# Patient Record
Sex: MALE | Race: WHITE | NOT HISPANIC OR LATINO | URBAN - METROPOLITAN AREA
[De-identification: names, ages, dates, MRNs, and addresses within clinical notes are randomized per-mention and may not be internally consistent; named-entity substitution may affect disease eponyms.]

---

## 2023-11-02 ENCOUNTER — OFFICE VISIT (OUTPATIENT)
Dept: PRIMARY CARE | Facility: CLINIC | Age: 18
End: 2023-11-02
Payer: COMMERCIAL

## 2023-11-02 VITALS
OXYGEN SATURATION: 98 % | HEIGHT: 69 IN | BODY MASS INDEX: 28.76 KG/M2 | HEART RATE: 68 BPM | WEIGHT: 194.2 LBS | DIASTOLIC BLOOD PRESSURE: 64 MMHG | SYSTOLIC BLOOD PRESSURE: 114 MMHG

## 2023-11-02 DIAGNOSIS — Z23 NEED FOR VACCINATION: ICD-10-CM

## 2023-11-02 DIAGNOSIS — F41.9 ANXIETY: ICD-10-CM

## 2023-11-02 DIAGNOSIS — Z00.00 PHYSICAL EXAM: Primary | ICD-10-CM

## 2023-11-02 PROCEDURE — 1036F TOBACCO NON-USER: CPT | Performed by: PHYSICIAN ASSISTANT

## 2023-11-02 PROCEDURE — 99395 PREV VISIT EST AGE 18-39: CPT | Performed by: PHYSICIAN ASSISTANT

## 2023-11-02 PROCEDURE — 90471 IMMUNIZATION ADMIN: CPT | Performed by: PHYSICIAN ASSISTANT

## 2023-11-02 ASSESSMENT — PATIENT HEALTH QUESTIONNAIRE - PHQ9
1. LITTLE INTEREST OR PLEASURE IN DOING THINGS: NOT AT ALL
2. FEELING DOWN, DEPRESSED OR HOPELESS: NOT AT ALL
SUM OF ALL RESPONSES TO PHQ9 QUESTIONS 1 AND 2: 0

## 2023-11-02 ASSESSMENT — PAIN SCALES - GENERAL: PAINLEVEL: 0-NO PAIN

## 2023-11-02 NOTE — PROGRESS NOTES
"Subjective   Patient ID: Elías Garcia is a 18 y.o. male who presents for Anxiety.    Presents for PE and complaints of anxiety.   Has had a couple months and recent visit with mental health - advised counseling and [facility].   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 Does report concussion last year.     Anxiety           Review of Systems   All other systems reviewed and are negative.      Objective   /64   Pulse 68   Ht 1.753 m (5' 9\")   Wt 88.1 kg (194 lb 3.2 oz)   SpO2 98%   BMI 28.68 kg/m²     Physical Exam  Constitutional:       General: He is not in acute distress.     Appearance: Normal appearance.   HENT:      Right Ear: Tympanic membrane and ear canal normal.      Left Ear: Tympanic membrane and ear canal normal.      Mouth/Throat:      Pharynx: Oropharynx is clear. No posterior oropharyngeal erythema.   Eyes:      Extraocular Movements: Extraocular movements intact.      Pupils: Pupils are equal, round, and reactive to light.   Cardiovascular:      Rate and Rhythm: Normal rate and regular rhythm.      Heart sounds: Normal heart sounds.   Pulmonary:      Breath sounds: Normal breath sounds.   Abdominal:      General: Bowel sounds are normal.      Palpations: Abdomen is soft.      Tenderness: There is no abdominal tenderness.   Musculoskeletal:         General: Normal range of motion.      Cervical back: Neck supple.   Skin:     Findings: No rash.   Neurological:      Mental Status: He is alert.       Assessment/Plan   Diagnoses and all orders for this visit:  Physical exam  -     Comprehensive Metabolic Panel; Future  -     CBC; Future  -     Lipid Panel; Future  -     Thyroid Stimulating Hormone; Future  Need for vaccination  -     Flu vaccine (IIV4) age 6 months and greater, preservative free         "

## 2023-11-02 NOTE — PATIENT INSTRUCTIONS
Recommend ongoing visit with mental health provider.   Routine labs ordered.   Flu vaccine updated; next year will be due for Tdap.

## 2025-02-18 ENCOUNTER — OFFICE VISIT (OUTPATIENT)
Dept: URGENT CARE | Age: 20
End: 2025-02-18
Payer: COMMERCIAL

## 2025-02-18 VITALS
DIASTOLIC BLOOD PRESSURE: 74 MMHG | TEMPERATURE: 98.2 F | SYSTOLIC BLOOD PRESSURE: 119 MMHG | HEART RATE: 68 BPM | OXYGEN SATURATION: 98 %

## 2025-02-18 DIAGNOSIS — R05.1 ACUTE COUGH: ICD-10-CM

## 2025-02-18 DIAGNOSIS — J01.90 ACUTE SINUSITIS, RECURRENCE NOT SPECIFIED, UNSPECIFIED LOCATION: Primary | ICD-10-CM

## 2025-02-18 RX ORDER — FLUTICASONE PROPIONATE 50 MCG
1 SPRAY, SUSPENSION (ML) NASAL 2 TIMES DAILY
Qty: 16 G | Refills: 0 | Status: SHIPPED | OUTPATIENT
Start: 2025-02-18 | End: 2026-02-18

## 2025-02-18 RX ORDER — AMOXICILLIN AND CLAVULANATE POTASSIUM 875; 125 MG/1; MG/1
1 TABLET, FILM COATED ORAL 2 TIMES DAILY
Qty: 20 TABLET | Refills: 0 | Status: SHIPPED | OUTPATIENT
Start: 2025-02-18 | End: 2025-02-28

## 2025-02-18 ASSESSMENT — ENCOUNTER SYMPTOMS
DIARRHEA: 0
VOMITING: 0
FEVER: 0
COUGH: 1
WHEEZING: 0
HEADACHES: 1
SINUS COMPLAINT: 1
NAUSEA: 0
SORE THROAT: 0
MYALGIAS: 0
SHORTNESS OF BREATH: 0
LOSS OF CONSCIOUSNESS: 0
RHINORRHEA: 0
FATIGUE: 0
ABDOMINAL PAIN: 0

## 2025-02-18 NOTE — LETTER
February 18, 2025     Patient: Elías Garcia   YOB: 2005   Date of Visit: 2/18/2025       To Whom It May Concern:    Elías Garcia was seen in my clinic on 2/18/2025. Please excuse Elías for his absence from school on this day to make the appointment.    If you have any questions or concerns, please don't hesitate to call.         Sincerely,         ANALISA Fleming-CNP        CC: No Recipients

## 2025-02-18 NOTE — PROGRESS NOTES
Subjective   Patient ID: Elías Garcia is a 19 y.o. male. They present today with a chief complaint of Nasal Congestion.    History of Present Illness    History provided by:  Patient   used: No    Sinus Problem  Location:  Maxillary  Quality:  Pressure  Severity:  Mild  Onset quality:  Gradual  Duration:  3 weeks  Timing:  Constant  Progression:  Worsening  Chronicity:  New  Relieved by:  Nothing  Worsened by:  Nothing  Ineffective treatments:  Sudafed  Associated symptoms: congestion, cough and headaches    Associated symptoms: no abdominal pain, no chest pain, no diarrhea, no ear pain, no fatigue, no fever, no loss of consciousness, no myalgias, no nausea, no rash, no rhinorrhea, no shortness of breath, no sore throat, no vomiting and no wheezing        Past Medical History  Allergies as of 02/18/2025    (No Known Allergies)       (Not in a hospital admission)       No past medical history on file.    Past Surgical History:   Procedure Laterality Date    WISDOM TOOTH EXTRACTION  04/2023        reports that he has never smoked. He has never used smokeless tobacco.    Review of Systems  Review of Systems   Constitutional:  Negative for fatigue and fever.   HENT:  Positive for congestion. Negative for ear pain, rhinorrhea and sore throat.    Respiratory:  Positive for cough. Negative for shortness of breath and wheezing.    Cardiovascular:  Negative for chest pain.   Gastrointestinal:  Negative for abdominal pain, diarrhea, nausea and vomiting.   Musculoskeletal:  Negative for myalgias.   Skin:  Negative for rash.   Neurological:  Positive for headaches. Negative for loss of consciousness.            Objective    Vitals:    02/18/25 1458   BP: 119/74   Pulse: 68   Temp: 36.8 °C (98.2 °F)   SpO2: 98%     No LMP for male patient.    Physical Exam  Vitals and nursing note reviewed.   Constitutional:       Appearance: Normal appearance.   HENT:      Head: Normocephalic and atraumatic.      Right Ear:  Hearing, tympanic membrane, ear canal and external ear normal.      Left Ear: Hearing, tympanic membrane, ear canal and external ear normal.      Nose: Mucosal edema, congestion and rhinorrhea present. No nasal deformity, septal deviation, signs of injury, laceration or nasal tenderness.      Right Sinus: No maxillary sinus tenderness or frontal sinus tenderness.      Left Sinus: No maxillary sinus tenderness or frontal sinus tenderness.      Mouth/Throat:      Lips: Pink.      Mouth: Mucous membranes are moist.      Pharynx: Uvula midline.      Tonsils: No tonsillar exudate or tonsillar abscesses.   Cardiovascular:      Rate and Rhythm: Normal rate and regular rhythm.      Heart sounds: Normal heart sounds.   Pulmonary:      Effort: Pulmonary effort is normal.      Breath sounds: Normal breath sounds and air entry.   Musculoskeletal:      Cervical back: Normal range of motion and neck supple.   Lymphadenopathy:      Cervical: No cervical adenopathy.   Neurological:      Mental Status: He is alert.   Psychiatric:         Mood and Affect: Mood normal.         Behavior: Behavior normal.         Procedures    Point of Care Test & Imaging Results from this visit  No results found for this visit on 02/18/25.   No results found.    Diagnostic study results (if any) were reviewed by BRANDEN Fleming.    Assessment/Plan   Allergies, medications, history, and pertinent labs/EKGs/Imaging reviewed by BRANDEN Fleming.     Medical Decision Making  Take the antibiotic with food.  Eat yogurt or take probiotic once a day.  Symptoms should improve in 2 to 3 days.   Flonase 1 spray in each nostril two times daily as needed for nasal congestion.  Wash your hands often, especially after coughing or touching your nose or mouth.  Use disposable tissues instead of handkerchiefs.  Increase fluid intake and rest as needed.  Take Tylenol and/or ibuprofen as needed for aches and pain and/or fever.  Return or follow-up with primary  care provider if symptoms did not improve.  Call 911 or go to the nearest emergency room if symptoms became severe such as fever of 102.5 degrees Fahrenheit or 39.2 degrees Celsius, severe pain, shortness of breath, chest tightness.   Patient verbalized understanding of the instructions and left in stable condition.      Orders and Diagnoses  Diagnoses and all orders for this visit:  Acute sinusitis, recurrence not specified, unspecified location  -     amoxicillin-pot clavulanate (Augmentin) 875-125 mg tablet; Take 1 tablet by mouth 2 times a day for 10 days.  -     fluticasone (Flonase) 50 mcg/actuation nasal spray; Administer 1 spray into each nostril 2 times a day. Shake gently. Before first use, prime pump. After use, clean tip and replace cap.  Acute cough      Medical Admin Record      Patient disposition: Home    Electronically signed by BRANDEN Fleming  3:06 PM

## 2025-05-19 ENCOUNTER — OFFICE VISIT (OUTPATIENT)
Dept: URGENT CARE | Age: 20
End: 2025-05-19
Payer: COMMERCIAL

## 2025-05-19 VITALS
SYSTOLIC BLOOD PRESSURE: 130 MMHG | TEMPERATURE: 98.5 F | RESPIRATION RATE: 18 BRPM | HEIGHT: 69 IN | HEART RATE: 72 BPM | WEIGHT: 190 LBS | DIASTOLIC BLOOD PRESSURE: 84 MMHG | BODY MASS INDEX: 28.14 KG/M2 | OXYGEN SATURATION: 98 %

## 2025-05-19 DIAGNOSIS — J01.90 ACUTE SINUSITIS, RECURRENCE NOT SPECIFIED, UNSPECIFIED LOCATION: Primary | ICD-10-CM

## 2025-05-19 DIAGNOSIS — J02.9 SORE THROAT: ICD-10-CM

## 2025-05-19 LAB
POC HUMAN RHINOVIRUS PCR: NEGATIVE
POC INFLUENZA A VIRUS PCR: NEGATIVE
POC INFLUENZA B VIRUS PCR: NEGATIVE
POC RESPIRATORY SYNCYTIAL VIRUS PCR: NEGATIVE
POC STREPTOCOCCUS PYOGENES (GROUP A STREP) PCR: NEGATIVE

## 2025-05-19 RX ORDER — AMOXICILLIN AND CLAVULANATE POTASSIUM 875; 125 MG/1; MG/1
875 TABLET, FILM COATED ORAL 2 TIMES DAILY
Qty: 14 TABLET | Refills: 0 | Status: SHIPPED | OUTPATIENT
Start: 2025-05-19 | End: 2025-05-26

## 2025-05-19 ASSESSMENT — ENCOUNTER SYMPTOMS
COUGH: 1
CARDIOVASCULAR NEGATIVE: 1
CONSTITUTIONAL NEGATIVE: 1
SORE THROAT: 1
SINUS COMPLAINT: 1

## 2025-05-19 NOTE — PROGRESS NOTES
"Subjective   Patient ID: Elías Garcia is a 19 y.o. male. They present today with a chief complaint of Sinus Problem, Sore Throat, and Generalized Body Aches (Fatigue 1 week ).    History of Present Illness  19-year-old male presents clinic today with complaints of sinus congestion.  Patient states he has been severely congested for about a week now.  Has developed a sore throat and slight cough.  Denies ear pain.  Denies fevers.  Denies chills.  Denies chest pain or shortness of breath.      Sinus Problem  Associated symptoms: congestion, cough and sore throat    Associated symptoms: no ear pain    Sore Throat   Associated symptoms include congestion and coughing. Pertinent negatives include no ear pain.       Past Medical History  Allergies as of 05/19/2025    (No Known Allergies)       Prescriptions Prior to Admission[1]     Medical History[2]    Surgical History[3]    Alcohol use questions deferred to the physician. He reports that he does not use drugs.    Review of Systems  Review of Systems   Constitutional: Negative.    HENT:  Positive for congestion and sore throat. Negative for ear pain.    Respiratory:  Positive for cough.    Cardiovascular: Negative.                                   Objective    Vitals:    05/19/25 1321   BP: 130/84   Pulse: 72   Resp: 18   Temp: 36.9 °C (98.5 °F)   TempSrc: Oral   SpO2: 98%   Weight: 86.2 kg (190 lb)   Height: 1.753 m (5' 9\")     No LMP for male patient.    Physical Exam  Constitutional:       General: He is not in acute distress.     Appearance: Normal appearance.   HENT:      Mouth/Throat:      Mouth: Mucous membranes are moist.      Pharynx: No oropharyngeal exudate or posterior oropharyngeal erythema.   Cardiovascular:      Rate and Rhythm: Normal rate and regular rhythm.      Heart sounds: Normal heart sounds.   Pulmonary:      Effort: Pulmonary effort is normal.      Breath sounds: Normal breath sounds.   Neurological:      Mental Status: He is alert. "         Procedures    Point of Care Test & Imaging Results from this visit  No results found for this visit on 05/19/25.   Imaging  No results found.    Cardiology, Vascular, and Other Imaging  No other imaging results found for the past 2 days      Diagnostic study results (if any) were reviewed by Tyler Martinez PA-C.    Assessment/Plan   Allergies, medications, history, and pertinent labs/EKGs/Imaging reviewed by Tyler Martinez PA-C.     Medical Decision Making  Patient pleasant cooperative on examination.    Cardiopulmonary exam within normal limits.  Vital signs stable.  No other acute abnormality noted.    Spot fire with strep completed and negative.    Due to patient's ongoing worsening congestion for over a week now I did start patient on Augmentin for suspected sinusitis.  Advised proper over-the-counter medication supplement with.    Monitor for worsening or persistent signs.    Patient agreeable with plan.  Patient alert and oriented, no acute distress upon discharge.    Orders and Diagnoses  Diagnoses and all orders for this visit:  Sore throat  -     POCT SPOTFIRE R/ST Panel Mini w/Strep A ([facility]) manually resulted      Medical Admin Record      Patient disposition: Home    Electronically signed by Tyler Martinez PA-C  1:41 PM           [1] (Not in a hospital admission)  [2] History reviewed. No pertinent past medical history.  [3]   Past Surgical History:  Procedure Laterality Date    WISDOM TOOTH EXTRACTION  04/2023

## 2025-05-19 NOTE — PATIENT INSTRUCTIONS
Consider taking Mucinex for congestion. Make sure to drink plenty of fluids while taking this medication as it increases its effectiveness.     Consider Zyrtec or Claritin    Consider Flonase Nasal Spray    Consider taking Sudafed to help decrease any congestion. If no history of high blood pressure.  Consider Corcedin BP for high blood pressure.    Use throat lozenges, buckwheat honey, gargling salt water to help relieve sore throat symptoms.     Recommend inhaling steam from a hot shower or hot bath as well as using saline sinus rinse for congestion. Recommend Jono Med sinus rinse. Make sure to use bottled or distilled water.

## 2025-08-27 ENCOUNTER — OFFICE VISIT (OUTPATIENT)
Dept: URGENT CARE | Age: 20
End: 2025-08-27
Payer: COMMERCIAL

## 2025-08-27 VITALS
RESPIRATION RATE: 17 BRPM | OXYGEN SATURATION: 96 % | SYSTOLIC BLOOD PRESSURE: 135 MMHG | DIASTOLIC BLOOD PRESSURE: 79 MMHG | HEART RATE: 73 BPM | TEMPERATURE: 97.8 F

## 2025-08-27 DIAGNOSIS — F41.9 ANXIETY: Primary | ICD-10-CM

## 2025-08-27 PROCEDURE — 99213 OFFICE O/P EST LOW 20 MIN: CPT

## 2025-08-27 PROCEDURE — 1036F TOBACCO NON-USER: CPT

## 2025-08-27 RX ORDER — HYDROXYZINE HYDROCHLORIDE 10 MG/1
10 TABLET, FILM COATED ORAL 3 TIMES DAILY PRN
Qty: 90 TABLET | Refills: 0 | Status: SHIPPED | OUTPATIENT
Start: 2025-08-27 | End: 2025-09-26

## 2025-08-27 ASSESSMENT — ENCOUNTER SYMPTOMS
NERVOUS/ANXIOUS: 1
RESPIRATORY NEGATIVE: 1
ACTIVITY CHANGE: 1
CARDIOVASCULAR NEGATIVE: 1
AGITATION: 1